# Patient Record
(demographics unavailable — no encounter records)

---

## 2025-01-10 NOTE — ASSESSMENT
[FreeTextEntry1] : 25-year-old with hx of asthma presents for evaluation of ALHAJI.   Data reviewed: OB notes reviewed CBC hgb- 11.9   Snoring Obesity Asthma  Patient does have signs and symptoms of ALHAJI so will plan to do sleep study. Discussed risk associated with moderate and severe ALHAJI including CAD, stroke, afib, and others. Discussed all treatment options including PAP therapy, oral appliance, and inspire. Patient also wheezing on examination so will plan to start pulmicort BID for the remainder of the pregnancy as she likely is part of the group who asthma gets worse during pregnancy.  - Pulmicort BID for asthma symptoms - Sleep study ordered  RTC after sleep study

## 2025-01-10 NOTE — HISTORY OF PRESENT ILLNESS
[Never] : never [TextBox_4] : 25 year old with previous hx of childhood asthma presenting for evaluation of ALHAJI given recent diagnosis of hypertrophic cardiomayopathy. Patient does states that she snores at night and has been told that she sounds as if she is choking. She does feel tired all the time and that has gotten worse during pregnancy. She was diagnosed with childhood asthma and has not needed an inhaler since she was an adult. She has noticed over the pregnancy however that she is wheezing more with some slight shortness of breath. She has no family hx of sleep disorders. [ESS] : 8

## 2025-01-21 NOTE — REASON FOR VISIT
[Home] : at home, [unfilled] , at the time of the visit. [Medical Office: ([facility])___] : at the medical office located in  [Patient] : the patient

## 2025-01-21 NOTE — HISTORY OF PRESENT ILLNESS
[FreeTextEntry1] : 25 year old with previous hx of childhood asthma, ?ALHAJI (being worked up for)  presenting for evaluation of HCM given recent diagnosis of hypertrophic cardiomyopathy.   She had an echo in [facility] that diagnosed hypertrophic cardiomyopathy 12/2024  She was diagnosed with childhood asthma and has not needed an inhaler since she was an adult. She has noticed over the pregnancy however that she is wheezing more with some slight shortness of breath. She has no family hx of sleep disorders.    1st pregnancy - 2017 - nl vaginal delivery - no issues 2nd pregnancy - currently 35 weeks GA

## 2025-01-24 NOTE — DISCUSSION/SUMMARY
[FreeTextEntry1] : In summary, Ms. JELENA BEAN 25 year - old F  Currently GA 35 weeks carries a PMH of childhood asthma, ?ALHAJI (being worked up for)  presenting for evaluation of HCM given recent diagnosis of hypertrophic cardiomyopathy.  She had an echo in [facility] that diagnosed hypertrophic cardiomyopathy 2024 She was diagnosed with childhood asthma and has not needed an inhaler since she was an adult. She has noticed over the pregnancy however that she is wheezing more with some slight shortness of breath. She has no family hx of sleep disorders. Patient currently feels GARZA but not worse than her baseline # BP Stable Encouraged Patient to monitor BP at home, keep a log, and report results back to us for evaluation.  Additionally, encouraged a heart-healthy diet and exercise as tolerated. EKG with no acute changes.   # HOCM evaluation-   TTE done this am - results pending

## 2025-01-24 NOTE — END OF VISIT
[FreeTextEntry3] : I, Dr. Dagmar Amato, personally performed the evaluation and management (E/M) services for this established patient who presents today with (a) new problem(s)/exacerbation of (an) existing condition(s).  That E/M includes conducting the examination, assessing all new/exacerbated conditions, and establishing a new plan of care.  Today, my ACP, was here to observe my evaluation and management services for this new problem/exacerbated condition to be followed going forward.

## 2025-01-24 NOTE — HISTORY OF PRESENT ILLNESS
[FreeTextEntry1] : Ms. JELENA BEAN 25 year - old F  Currently GA 35 weeks carries a PMH of childhood asthma, ?ALHAIJ (being worked up for)  presenting for evaluation of HCM given recent diagnosis of hypertrophic cardiomyopathy.  She had an echo in [facility] that diagnosed hypertrophic cardiomyopathy 2024 She was diagnosed with childhood asthma and has not needed an inhaler since she was an adult. She has noticed over the pregnancy however that she is wheezing more with some slight shortness of breath. She has no family hx of sleep disorders. Patient currently feels GARZA but not worse than her baseline  1st pregnancy - 2017 - nl vaginal delivery - no issues 2nd pregnancy - currently 35 weeks GA  # BP Stable Encouraged Patient to monitor BP at home, keep a log, and report results back to us for evaluation.  Additionally, encouraged a heart-healthy diet and exercise as tolerated. EKG with no acute changes.   # HOCM evaluation-   TTE done this am - results pending

## 2025-01-24 NOTE — DISCUSSION/SUMMARY
[FreeTextEntry1] : 25 year old with previous hx of childhood asthma, ?ALHAJI (being worked up for)  presenting for evaluation of HCM given recent diagnosis of hypertrophic cardiomyopathy.   She had an echo in [facility] that diagnosed hypertrophic cardiomyopathy 12/2024  She was diagnosed with childhood asthma and has not needed an inhaler since she was an adult. She has noticed over the pregnancy however that she is wheezing more with some slight shortness of breath. She has no family hx of sleep disorders.    1st pregnancy - 2017 - nl vaginal delivery - no issues 2nd pregnancy - currently 35 weeks GA  BP stable as per the pt Encouraged the patient to monitor blood pressure at home, keep a log, and report results back to us for evaluation. Based on results, we will adjust the regimen as necessary. ecg will be done in the next in-patient office will need to get an echo as soon as possible before making any further recommendations currently feels GARZA but not worse than her baseline [EKG obtained to assist in diagnosis and management of assessed problem(s)] : EKG obtained to assist in diagnosis and management of assessed problem(s)

## 2025-03-13 NOTE — HISTORY OF PRESENT ILLNESS
[Postpartum Follow Up] : postpartum follow up [Complications:___] : complications include: [unfilled] [Last Pap Date: ___] : Last Pap Date: [unfilled] [Delivery Date: ___] : on [unfilled] [Primary C/S] : delivered by  section [Female] : Delivery History: baby girl [Wt. ___] : weighing [unfilled] [Rhogam] : Rhogam was not administered [Rubella Vaccine] : Rubella vaccine was not administered [Pertussis Vaccine] : Pertussis vaccine was not administered [BTL] : no tubal ligation [Breastfeeding] : currently nursing [Discharge HCT: ___] : hematocrit level was [unfilled] [Discharge HGB: ___] : hemoglobin level was [unfilled] [Resumed Menses] : has not resumed her menses [Resumed [place]] : has not resumed intercourse [Intended Contraception] : Intended Contraception: [IUD] : intrauterine device [S/Sx PP Depression] : no signs/symptoms of postpartum depression [None] : No associated symptoms are reported [Clean/Dry/Intact] : clean, dry and intact [Doing Well] : is doing well [FreeTextEntry8] : Here for wound check.  3/1/25 [de-identified] : Wants Paraguard [de-identified] : Keep incision clean and dry. No heavy lifting. Follow up with pulmonary and cardiology. Contraceptive couseling- wants Paraguard. BP today 132/80. Instructed to monitor BP at home- to report elevations or signs PEC. RTC 4 weeks. MHegarty NP

## 2025-04-10 NOTE — HISTORY OF PRESENT ILLNESS
[Postpartum Follow Up] : postpartum follow up [Last Pap Date: ___] : Last Pap Date: [unfilled] [Delivery Date: ___] : on [unfilled] [Primary C/S] : delivered by  section [Female] : Delivery History: baby girl [Wt. ___] : weighing [unfilled] [Breastfeeding] : currently nursing [Discharge HCT: ___] : hematocrit level was [unfilled] [Discharge HGB: ___] : hemoglobin level was [unfilled] [Intended Contraception] : Intended Contraception: [IUD] : intrauterine device [Healed] : healed [Back to Normal] : is back to normal in size [None] : no vaginal bleeding [Normal] : the vagina was normal [Doing Well] : is doing well [No [place]] : to avoid sexual intercourse [Unlimited ADLs] : to participate in activities of daily living without limitations as pain allows [Limited Work] : to work with limitations [Limited Housework] : to do housework with limitations [No Sports] : not to participate in sports [Rhogam] : Rhogam was not administered [Rubella Vaccine] : Rubella vaccine was not administered [Pertussis Vaccine] : Pertussis vaccine was not administered [BTL] : no tubal ligation [Resumed Menses] : has not resumed her menses [Resumed [place]] : has not resumed intercourse [de-identified] : s/p C/S here for PP visit.  Patient and baby are doing well.  Baby getting mostly breast milk.  Patient has hx of hypertrophic cardiomyopathy.  Saw cards OB at [facility].  TTE negative. Is monitoring BP's at home and reports stable.  Todays visit /85.  Patient had sleep study for ALHAJI but has not gotten results  yet.  Would like IUD for contraception [de-identified] : Follow up with OB as needed.  Continue to monitor blood pressure daily.  Parameters reviewed.  Advised to make appt./establish care with PCP.  SW in to see patient.  RTC for family planning/IUD placement.   [de-identified] : x 3 more weeks

## 2025-04-18 NOTE — HISTORY OF PRESENT ILLNESS
[Never] : never [TextBox_4] : 25 year old with previous hx of childhood asthma presenting for evaluation of ALHAJI given recent diagnosis of hypertrophic cardiomayopathy. Patient does states that she snores at night and has been told that she sounds as if she is choking. She does feel tired all the time and that has gotten worse during pregnancy. She was diagnosed with childhood asthma and has not needed an inhaler since she was an adult. She has noticed over the pregnancy however that she is wheezing more with some slight shortness of breath. She has no family hx of sleep disorders.   04/17/25 Patient presenting for follow up to discuss sleep study results. States that she has no new symptoms. Does feel that the snoring has gotten better post pregnancy. She has to wake up a lot because of the baby but does not feel that anything else wakes her up. She does nap during the day but when the baby is sleeping mostly. [ESS] : 8

## 2025-04-18 NOTE — REASON FOR VISIT
[Home] : at home, [unfilled] , at the time of the visit. [Medical Office: ([facility])___] : at the medical office located in  [Telephone (audio)] : This telephonic visit was provided via audio only technology. [No access to tele-video equipment] : patient lacks access to tele-video equipment. [Verbal consent obtained from patient] : the patient, [unfilled] [Follow-Up] : a follow-up visit [Sleep Evaluation] : sleep evaluation [Asthma] : asthma [Sleep Apnea] : sleep apnea

## 2025-04-18 NOTE — ASSESSMENT
[FreeTextEntry1] : 25-year-old with hx of asthma presents for evaluation of ALHAJI.   Data reviewed: OB notes reviewed CBC hgb- 11.9 Sleep study 03/31/25- AHI 4.9-9.8- mild ALHAJI   Snoring Obesity Asthma  Sleep study showing mild ALHAJI. Discussed symptoms with patient which she feels are minimal and do not bother her very much. Given this will plan to hold off on treatment for now and will continue to monitor symptoms. Patient advised to return to clinic if new or worsening symptoms arise.  -Sleep study with mild ALHAJI - Will hold off on treatment as patient feels symptoms are minimal